# Patient Record
Sex: FEMALE | Race: WHITE | Employment: UNEMPLOYED | URBAN - METROPOLITAN AREA
[De-identification: names, ages, dates, MRNs, and addresses within clinical notes are randomized per-mention and may not be internally consistent; named-entity substitution may affect disease eponyms.]

---
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
## 2020-01-27 ENCOUNTER — HOSPITAL ENCOUNTER (EMERGENCY)
Age: 20
Discharge: HOME | End: 2020-01-28
Attending: EMERGENCY MEDICINE
Payer: COMMERCIAL

## 2020-01-27 ENCOUNTER — APPOINTMENT (OUTPATIENT)
Dept: GENERAL RADIOLOGY | Age: 20
End: 2020-01-27
Payer: COMMERCIAL

## 2020-01-27 VITALS
TEMPERATURE: 98.7 F | HEIGHT: 70 IN | SYSTOLIC BLOOD PRESSURE: 117 MMHG | WEIGHT: 220 LBS | HEART RATE: 105 BPM | DIASTOLIC BLOOD PRESSURE: 75 MMHG | RESPIRATION RATE: 20 BRPM | BODY MASS INDEX: 31.5 KG/M2 | OXYGEN SATURATION: 98 %

## 2020-01-27 LAB
A/G RATIO: 1.5 (ref 1.1–2.2)
ALBUMIN SERPL-MCNC: 4.4 G/DL (ref 3.4–5)
ALP BLD-CCNC: 145 U/L (ref 40–129)
ALT SERPL-CCNC: 15 U/L (ref 10–40)
ANION GAP SERPL CALCULATED.3IONS-SCNC: 13 MMOL/L (ref 3–16)
AST SERPL-CCNC: 16 U/L (ref 15–37)
BACTERIA: ABNORMAL /HPF
BASOPHILS ABSOLUTE: 0 K/UL (ref 0–0.2)
BASOPHILS RELATIVE PERCENT: 0.3 %
BILIRUB SERPL-MCNC: 1.3 MG/DL (ref 0–1)
BILIRUBIN URINE: ABNORMAL
BLOOD, URINE: NEGATIVE
BUN BLDV-MCNC: 14 MG/DL (ref 7–20)
CALCIUM SERPL-MCNC: 9.1 MG/DL (ref 8.3–10.6)
CHLORIDE BLD-SCNC: 105 MMOL/L (ref 99–110)
CLARITY: CLEAR
CO2: 19 MMOL/L (ref 21–32)
COLOR: YELLOW
CREAT SERPL-MCNC: 0.7 MG/DL (ref 0.6–1.1)
EOSINOPHILS ABSOLUTE: 0 K/UL (ref 0–0.6)
EOSINOPHILS RELATIVE PERCENT: 0.3 %
EPITHELIAL CELLS, UA: ABNORMAL /HPF
GFR AFRICAN AMERICAN: >60
GFR NON-AFRICAN AMERICAN: >60
GLOBULIN: 2.9 G/DL
GLUCOSE BLD-MCNC: 104 MG/DL (ref 70–99)
GLUCOSE URINE: NEGATIVE MG/DL
HCG QUALITATIVE: NEGATIVE
HCT VFR BLD CALC: 42.4 % (ref 36–48)
HEMOGLOBIN: 14.6 G/DL (ref 12–16)
KETONES, URINE: >=80 MG/DL
LACTIC ACID: 1 MMOL/L (ref 0.4–2)
LEUKOCYTE ESTERASE, URINE: NEGATIVE
LYMPHOCYTES ABSOLUTE: 0.5 K/UL (ref 1–5.1)
LYMPHOCYTES RELATIVE PERCENT: 8.7 %
MCH RBC QN AUTO: 31 PG (ref 26–34)
MCHC RBC AUTO-ENTMCNC: 34.6 G/DL (ref 31–36)
MCV RBC AUTO: 89.7 FL (ref 80–100)
MICROSCOPIC EXAMINATION: YES
MONOCYTES ABSOLUTE: 0.2 K/UL (ref 0–1.3)
MONOCYTES RELATIVE PERCENT: 3.5 %
MUCUS: ABNORMAL /LPF
NEUTROPHILS ABSOLUTE: 5 K/UL (ref 1.7–7.7)
NEUTROPHILS RELATIVE PERCENT: 87.2 %
NITRITE, URINE: NEGATIVE
PDW BLD-RTO: 13 % (ref 12.4–15.4)
PH UA: 6 (ref 5–8)
PLATELET # BLD: 187 K/UL (ref 135–450)
PMV BLD AUTO: 8.7 FL (ref 5–10.5)
POTASSIUM REFLEX MAGNESIUM: 3.6 MMOL/L (ref 3.5–5.1)
PROCALCITONIN: 0.4 NG/ML (ref 0–0.15)
PROTEIN UA: ABNORMAL MG/DL
RAPID INFLUENZA  B AGN: NEGATIVE
RAPID INFLUENZA A AGN: NEGATIVE
RBC # BLD: 4.73 M/UL (ref 4–5.2)
RBC UA: ABNORMAL /HPF (ref 0–2)
SODIUM BLD-SCNC: 137 MMOL/L (ref 136–145)
SPECIFIC GRAVITY UA: >=1.03 (ref 1–1.03)
SPECIMEN STATUS: NORMAL
TOTAL PROTEIN: 7.3 G/DL (ref 6.4–8.2)
URINE REFLEX TO CULTURE: ABNORMAL
URINE TYPE: ABNORMAL
UROBILINOGEN, URINE: 0.2 E.U./DL
WBC # BLD: 5.7 K/UL (ref 4–11)
WBC UA: ABNORMAL /HPF (ref 0–5)

## 2020-01-27 PROCEDURE — 6370000000 HC RX 637 (ALT 250 FOR IP): Performed by: EMERGENCY MEDICINE

## 2020-01-27 PROCEDURE — 96361 HYDRATE IV INFUSION ADD-ON: CPT

## 2020-01-27 PROCEDURE — 99285 EMERGENCY DEPT VISIT HI MDM: CPT

## 2020-01-27 PROCEDURE — 93005 ELECTROCARDIOGRAM TRACING: CPT | Performed by: EMERGENCY MEDICINE

## 2020-01-27 PROCEDURE — 71046 X-RAY EXAM CHEST 2 VIEWS: CPT

## 2020-01-27 PROCEDURE — 2580000003 HC RX 258: Performed by: EMERGENCY MEDICINE

## 2020-01-27 PROCEDURE — 85025 COMPLETE CBC W/AUTO DIFF WBC: CPT

## 2020-01-27 PROCEDURE — 84145 PROCALCITONIN (PCT): CPT

## 2020-01-27 PROCEDURE — 81001 URINALYSIS AUTO W/SCOPE: CPT

## 2020-01-27 PROCEDURE — 84703 CHORIONIC GONADOTROPIN ASSAY: CPT

## 2020-01-27 PROCEDURE — 80053 COMPREHEN METABOLIC PANEL: CPT

## 2020-01-27 PROCEDURE — 87040 BLOOD CULTURE FOR BACTERIA: CPT

## 2020-01-27 PROCEDURE — 87804 INFLUENZA ASSAY W/OPTIC: CPT

## 2020-01-27 PROCEDURE — 83605 ASSAY OF LACTIC ACID: CPT

## 2020-01-27 PROCEDURE — 96360 HYDRATION IV INFUSION INIT: CPT

## 2020-01-27 RX ORDER — ACETAMINOPHEN 500 MG
1000 TABLET ORAL ONCE
Status: COMPLETED | OUTPATIENT
Start: 2020-01-27 | End: 2020-01-27

## 2020-01-27 RX ORDER — 0.9 % SODIUM CHLORIDE 0.9 %
30 INTRAVENOUS SOLUTION INTRAVENOUS ONCE
Status: COMPLETED | OUTPATIENT
Start: 2020-01-27 | End: 2020-01-27

## 2020-01-27 RX ADMIN — SODIUM CHLORIDE 2055 ML: 9 INJECTION, SOLUTION INTRAVENOUS at 21:08

## 2020-01-27 RX ADMIN — ACETAMINOPHEN 1000 MG: 500 TABLET ORAL at 21:07

## 2020-01-27 ASSESSMENT — PAIN SCALES - GENERAL: PAINLEVEL_OUTOF10: 0

## 2020-01-28 LAB
BACTERIA: ABNORMAL /HPF
BILIRUBIN URINE: ABNORMAL
BLOOD, URINE: ABNORMAL
CLARITY: CLEAR
COLOR: YELLOW
EKG ATRIAL RATE: 124 BPM
EKG ATRIAL RATE: 130 BPM
EKG DIAGNOSIS: NORMAL
EKG DIAGNOSIS: NORMAL
EKG P AXIS: 55 DEGREES
EKG P AXIS: 59 DEGREES
EKG P-R INTERVAL: 136 MS
EKG P-R INTERVAL: 138 MS
EKG Q-T INTERVAL: 306 MS
EKG Q-T INTERVAL: 310 MS
EKG QRS DURATION: 84 MS
EKG QRS DURATION: 84 MS
EKG QTC CALCULATION (BAZETT): 439 MS
EKG QTC CALCULATION (BAZETT): 456 MS
EKG R AXIS: 66 DEGREES
EKG R AXIS: 86 DEGREES
EKG T AXIS: -2 DEGREES
EKG T AXIS: -36 DEGREES
EKG VENTRICULAR RATE: 124 BPM
EKG VENTRICULAR RATE: 130 BPM
EPITHELIAL CELLS, UA: ABNORMAL /HPF
GLUCOSE URINE: NEGATIVE MG/DL
KETONES, URINE: >=80 MG/DL
LEUKOCYTE ESTERASE, URINE: NEGATIVE
MICROSCOPIC EXAMINATION: YES
MUCUS: ABNORMAL /LPF
NITRITE, URINE: NEGATIVE
PH UA: 6 (ref 5–8)
PROTEIN UA: ABNORMAL MG/DL
RBC UA: ABNORMAL /HPF (ref 0–2)
SPECIFIC GRAVITY UA: >=1.03 (ref 1–1.03)
URINE TYPE: ABNORMAL
UROBILINOGEN, URINE: 0.2 E.U./DL
WBC UA: ABNORMAL /HPF (ref 0–5)

## 2020-01-28 PROCEDURE — 93010 ELECTROCARDIOGRAM REPORT: CPT | Performed by: INTERNAL MEDICINE

## 2020-01-28 RX ORDER — ONDANSETRON 4 MG/1
4 TABLET, ORALLY DISINTEGRATING ORAL EVERY 8 HOURS PRN
Qty: 20 TABLET | Refills: 0 | Status: SHIPPED | OUTPATIENT
Start: 2020-01-28 | End: 2020-02-04

## 2020-01-28 ASSESSMENT — PAIN SCALES - GENERAL: PAINLEVEL_OUTOF10: 0

## 2020-01-28 NOTE — ED PROVIDER NOTES
Pain Edu? Excl. in [address]? GENERAL:  Awake, alert. Well developed, well nourished with no apparent distress. Nontoxic-appearing, non-ill-appearing. HENT:  Normocephalic, Atraumatic, no lacerations. EYES:  Conjunctiva normal, Pupils equal round and reactive to light, extraocular movements normal.  NECK:  Trachea is midline. No stridor. CHEST: Elevated rate and regular rhythm, no murmurs/rubs/gallops, normal S1/S2, chest wall non-tender. LUNGS:  No respiratory distress. No abdominal retractions, no sternal retractions. Clear to auscultation bilaterally, no wheezing, no rhochi, no rales  ABDOMEN:  Soft, non-tender, non-distended. No guarding and no rebound. No costovertebral angle tenderness to palpation. Normal BS, no organomegaly, no abdominal masses  EXTREMITIES:  Normal range of motion, no edema, no tenderness, no deformity, distal pulses present and equal bilaterally. Moves extremities x4 with purpose. SKIN: Warm, dry and intact. NEUROLOGIC: Normal mental status. Moving all extremities to command. Alert and oriented x4 without focal motor deficit or gross sensory deficit. Normal speech. PSYCHIATRIC: Not anxious, normal mood and affect, thoughts are linear and organized, without delusions/hallucinations, responds appropriately to questions      LABS and DIAGNOSTIC RESULTS  EKG  The Ekg interpreted by me shows  sinus tachycardia, kbug=[id number] with a rate of 130  Axis is   Normal  QTc is  normal  Intervals and Durations are unremarkable. ST Segments: normal  Delta waves, Brugada Syndrome, and Short IA are not present. Prior EKG to compare with was not available. Cardiac Monitor Strip Interpretation    Interpreted by me  Monitor strip interpreted for greater than 10 seconds    Rhythm: sinus tachycardia  Rate: 120-130  Ectopy: none  ST Segments: normal    RADIOLOGY  X-RAYS:  I have reviewed radiologic plain film image(s).   ALL OTHER NON-PLAIN FILM IMAGES SUCH AS CT, ULTRASOUND AND U/L    Globulin 2.9 g/dL   Urinalysis Reflex to Culture   Result Value Ref Range    Color, UA Yellow Straw/Yellow    Clarity, UA Clear Clear    Glucose, Ur Negative Negative mg/dL    Bilirubin Urine MODERATE (A) Negative    Ketones, Urine >=80 (AA) Negative mg/dL    Specific Gravity, UA >=1.030 1.005 - 1.030    Blood, Urine Negative Negative    pH, UA 6.0 5.0 - 8.0    Protein, UA TRACE (A) Negative mg/dL    Urobilinogen, Urine 0.2 <2.0 E.U./dL    Nitrite, Urine Negative Negative    Leukocyte Esterase, Urine Negative Negative    Microscopic Examination YES     Urine Type NotGiven     Urine Reflex to Culture Not Indicated    Lactic Acid, Plasma   Result Value Ref Range    Lactic Acid 1.0 0.4 - 2.0 mmol/L   HCG Qualitative, Serum   Result Value Ref Range    hCG Qual Negative Detects HCG level >10 MIU/mL   Procalcitonin   Result Value Ref Range    Procalcitonin 0.40 (H) 0.00 - 0.15 ng/mL   Sample possible blood bank testing   Result Value Ref Range    Specimen Status SHADY    Microscopic Urinalysis   Result Value Ref Range    Mucus, UA 3+ (A) /LPF    WBC, UA 3-5 0 - 5 /HPF    RBC, UA 0-2 0 - 2 /HPF    Epi Cells 10-20 /HPF    Bacteria, UA 2+ (A) /HPF   Urinalysis, reflex to microscopic   Result Value Ref Range    Color, UA Yellow Straw/Yellow    Clarity, UA Clear Clear    Glucose, Ur Negative Negative mg/dL    Bilirubin Urine SMALL (A) Negative    Ketones, Urine >=80 (AA) Negative mg/dL    Specific Gravity, UA >=1.030 1.005 - 1.030    Blood, Urine TRACE-INTACT (A) Negative    pH, UA 6.0 5.0 - 8.0    Protein, UA TRACE (A) Negative mg/dL    Urobilinogen, Urine 0.2 <2.0 E.U./dL    Nitrite, Urine Negative Negative    Leukocyte Esterase, Urine Negative Negative    Microscopic Examination YES     Urine Type NotGiven    Microscopic Urinalysis   Result Value Ref Range    Mucus, UA 3+ (A) /LPF    WBC, UA 3-5 0 - 5 /HPF    RBC, UA 0-2 0 - 2 /HPF    Epi Cells 10-20 /HPF    Bacteria, UA 2+ (A) /HPF   EKG 12 lead   Result Value Ref Range    Ventricular Rate 124 BPM    Atrial Rate 124 BPM    P-R Interval 136 ms    QRS Duration 84 ms    Q-T Interval 306 ms    QTc Calculation (Bazett) 439 ms    P Axis 55 degrees    R Axis 66 degrees    T Axis -2 degrees    Diagnosis       Sinus tachycardiaPossible Left atrial enlargementST & T wave abnormality, consider inferior ischemiaST & T wave abnormality, consider anterior ischemiaAbnormal ECGNo previous ECGs available       PROCEDURES      MEDICAL DECISION MAKING    Procedures/interventions/images ordered for this visit  Orders Placed This Encounter   Procedures    Culture blood #1    Culture blood #2    Rapid influenza A/B antigens    XR CHEST STANDARD (2 VW)    CBC Auto Differential    Comprehensive Metabolic Panel w/ Reflex to MG    Urinalysis Reflex to Culture    Lactic Acid, Plasma    HCG Qualitative, Serum    Procalcitonin    Sample possible blood bank testing    Initiate Oxygen Therapy Protocol    EKG 12 lead       Medications ordered for this visit  Orders Placed This Encounter   Medications    acetaminophen (TYLENOL) tablet 1,000 mg    0.9 % sodium chloride IV bolus 2,055 mL       ED course notes for this visit  ED Course as of Jan 28 0054   Tue Jan 28, 2020   0049 Was sleeping comfortably in the room, she has no complaints she is feeling significantly better and her heart rate was 93 when I walked in the room. [SG]      ED Course User Index  [SG] Byron Clinton MD         She did not have a decrease in ketones however some of her other labs within the urinalysis has improved and the patient continues to be feels significantly better and her heart rate has dropped down to within normal limits.     - Patient seen and evaluated in room 1  - Patient was given IV fluids and Tylenol in the ED  - Upon reassessment, patient Feeling significantly better and has no complaints, continues to be nontoxic appearing and non-ill-appearing  - Diagnostic studies reviewed and results discussed with patient and spouse/SO    This is a very pleasant patient who presents with fever, vomiting and a watery, non-bloody diarrhea. The abdominal exam is benign. After an anti-emetic was given, the patient was reassessed and has tolerated their PO challenge without difficulty. Patient has a benign abdominal exam.    There has been no recent antibiotic use, exposure to suspect c.diff. colitis, or other risk factors for bacterial infectious diarrhea. There is no significant evidence of severe dehydration, surgical abdominal process, severe electrolyte abnormality, toxicity, shock, sepsis, hemodynamic or cardiopulmonary instability, increased intracranial pressure, or any disease process requiring other immediate surgical or medical intervention including in-patient admission at this time. Bacterial, parasitic, food borne, drug-associated causes, ischemic colitis, obstruction, appendicitis, and inflammatory bowel disease have also been considered but felt to be less likely at this time. It was explained that this is most likely viral but if the patient is not improving as expected in the next 24-36 hours, or if other new signs or symptoms of concern develop, other etiologies or diagnoses may need to be considered requiring other tests, treatments, consultations, and/or admission. The diagnosis, plan, expected course, follow-up, and return precautions were discussed and all questions were answered. Final Impression    1. SIRS (systemic inflammatory response syndrome) (HCC)    2. Fever, unspecified fever cause    3. Nausea vomiting and diarrhea    4. Dehydration    5. Sinus tachycardia        Blood pressure 117/75, pulse 105, temperature 98.7 °F (37.1 °C), temperature source Oral, resp. rate 20, height 5' 10\" (1.778 m), weight 220 lb (99.8 kg), SpO2 98 %, currently breastfeeding. Disposition  At this point I do not feel the patient requires further work up and it is reasonable to discharge the patient.  I had a discussion with the patient and/or their surrogate regarding diagnosis, diagnostic testing results, treatment/ plan of care, and follow up. Patient and/or companions verbalized understanding of the ED workup, any relevant findings as well as any incidental findings, and the disposition and plan. There was shared decision-making between myself as well as the patient and/or their surrogate and we are all in agreement with discharge home. There was an opportunity for questions and all questions were answered to the best of my ability and to the satisfaction of the patient and/or patient family. Patient agreed to follow up as recommend for further evaluation/treatment. The patient was given strict return precautions as we discussed symptoms that would necessitate return to the ED. Patient will return to ED for new/worsening symptoms. The patient verbalized their understanding and agreement with the above plan. Please refer to AVS for further details regarding discharge instructions. Patient was given scripts for the following medications. I counseled patient how to take these medications. New Prescriptions    ONDANSETRON (ZOFRAN-ODT) 4 MG DISINTEGRATING TABLET    Place 1 tablet under the tongue every 8 hours as needed for Nausea or Vomiting May Sub regular tablet (non-ODT) if insurance does not cover ODT. Pt is in stable condition upon Discharge to home. Please note, critical care time was at least 15 minutes, obtaining history, conducting a physical exam, performing and monitoring interventions, ordering, collecting and interpreting tests, and establishing medical decision-making and discussion with the patient and/or family, specifically for management of the presenting complaint and symptoms initially, direct bedside care, reevaluation, review of records, and consultation.   There was a high probability of clinically significant life-threatening deterioration in the patient's condition, which

## 2020-01-28 NOTE — ED NOTES
Bed: 01  Expected date:   Expected time:   Means of arrival:   Comments:  wanda Reich, [facility]0 [facility]  01/27/20 2041

## 2020-01-31 LAB
BLOOD CULTURE, ROUTINE: NORMAL
CULTURE, BLOOD 2: NORMAL

## (undated) DEVICE — MAJOR SET UP PK

## (undated) DEVICE — STANDARD HYPODERMIC NEEDLE,POLYPROPYLENE HUB: Brand: MONOJECT

## (undated) DEVICE — INTENDED FOR TISSUE SEPARATION, AND OTHER PROCEDURES THAT REQUIRE A SHARP SURGICAL BLADE TO PUNCTURE OR CUT.: Brand: BARD-PARKER ® STAINLESS STEEL BLADES

## (undated) DEVICE — PUMP SUC IRR TBNG L10FT W/ HNDPC ASSEMB STRYKEFLOW 2

## (undated) DEVICE — TUBING, SUCTION, 3/16" X 10', STRAIGHT: Brand: MEDLINE

## (undated) DEVICE — DRAPE,ABDOMINAL,MAJOR,STERILE: Brand: MEDLINE

## (undated) DEVICE — SUTURE SZ 0 27IN 5/8 CIR UR-6  TAPER PT VIOLET ABSRB VICRYL J603H

## (undated) DEVICE — CATHETER CHOLGM 4.5FR L18IN W/ MTL SUPP TB

## (undated) DEVICE — 3M™ TEGADERM™ TRANSPARENT FILM DRESSING FRAME STYLE, 1624W, 2-3/8 IN X 2-3/4 IN (6 CM X 7 CM), 100/CT 4CT/CASE: Brand: 3M™ TEGADERM™

## (undated) DEVICE — APPLICATOR PREP 26ML 0.7% IOD POVACRYLEX 74% ISO ALC ST

## (undated) DEVICE — SOLUTION IV IRRIG 500ML 0.9% SODIUM CHL 2F7123

## (undated) DEVICE — SYRINGE MED 10ML LUERLOCK TIP W/O SFTY DISP

## (undated) DEVICE — CIRCUIT ANES L72IN 3L BACT AND VIR FLTR EL CONN SGL LIMB

## (undated) DEVICE — TROCAR ENDOSCP L100MM DIA5MM BLDELSS STBL SL OBT RADLUC

## (undated) DEVICE — DRAPE C ARM UNIV W41XL74IN CLR PLAS XR VELC CLSR POLY STRP

## (undated) DEVICE — NEEDLE INSUF L120MM DIA2MM DISP FOR PNEUMOPERI ENDOPATH

## (undated) DEVICE — GLOVE ORANGE PI 8 1/2   MSG9085

## (undated) DEVICE — CATHETER IV 14GA L1.25IN PTFE ORNG FEP SFTY STR HUB RADPQ

## (undated) DEVICE — GOWN,AURORA,NONREINF,RAGLAN,XXL,STERILE: Brand: MEDLINE

## (undated) DEVICE — CORD ES L10FT MPLR LAP

## (undated) DEVICE — TUBING INSUF ISO CONN DISP

## (undated) DEVICE — CO2 INSUFFLATION NEEDLE: Brand: CORE DYNAMICS

## (undated) DEVICE — SUTURE VCRL SZ 4-0 L18IN ABSRB UD L19MM PS-2 3/8 CIR PRIM J496H

## (undated) DEVICE — YANKAUER,BULB TIP,W/O VENT,RIGID,STERILE: Brand: MEDLINE

## (undated) DEVICE — 3M™ STERI-STRIP™ COMPOUND BENZOIN TINCTURE 40 BAGS/CARTON 4 CARTONS/CASE C1544: Brand: 3M™ STERI-STRIP™

## (undated) DEVICE — APPLIER CLP M L L11.4IN DIA10MM ENDOSCP ROT MULT FOR LIG

## (undated) DEVICE — GAUZE SPONGES,8 PLY: Brand: CURITY

## (undated) DEVICE — Device: Brand: MEDEX

## (undated) DEVICE — ELECTRODE PT RET AD L9FT HI MOIST COND ADH HYDRGEL CORDED

## (undated) DEVICE — KIT,ANTI FOG,W/SPONGE & FLUID,SOFT PACK: Brand: MEDLINE

## (undated) DEVICE — TROCAR ENDOSCP L100MM DIA5MM BLDELSS STBL SL THRD OPT VW